# Patient Record
Sex: FEMALE | Race: WHITE | URBAN - NONMETROPOLITAN AREA
[De-identification: names, ages, dates, MRNs, and addresses within clinical notes are randomized per-mention and may not be internally consistent; named-entity substitution may affect disease eponyms.]

---

## 2020-08-20 ENCOUNTER — HISTORICAL (OUTPATIENT)
Dept: ADMINISTRATIVE | Facility: HOSPITAL | Age: 45
End: 2020-08-20

## 2024-07-11 ENCOUNTER — OFFICE VISIT (OUTPATIENT)
Dept: OBSTETRICS AND GYNECOLOGY | Facility: CLINIC | Age: 49
End: 2024-07-11
Payer: COMMERCIAL

## 2024-07-11 VITALS
BODY MASS INDEX: 45.31 KG/M2 | SYSTOLIC BLOOD PRESSURE: 138 MMHG | DIASTOLIC BLOOD PRESSURE: 84 MMHG | WEIGHT: 240 LBS | HEIGHT: 61 IN

## 2024-07-11 DIAGNOSIS — E66.01 MORBID OBESITY WITH BMI OF 45.0-49.9, ADULT: ICD-10-CM

## 2024-07-11 DIAGNOSIS — R45.86 MOOD SWING: ICD-10-CM

## 2024-07-11 DIAGNOSIS — Z01.411 ENCOUNTER FOR GYNECOLOGICAL EXAMINATION (GENERAL) (ROUTINE) WITH ABNORMAL FINDINGS: Primary | ICD-10-CM

## 2024-07-11 DIAGNOSIS — F41.9 ANXIETY: ICD-10-CM

## 2024-07-11 DIAGNOSIS — Z12.39 ENCOUNTER FOR SCREENING FOR MALIGNANT NEOPLASM OF BREAST, UNSPECIFIED SCREENING MODALITY: ICD-10-CM

## 2024-07-11 DIAGNOSIS — R23.2 HOT FLASHES: ICD-10-CM

## 2024-07-11 DIAGNOSIS — N95.2 VAGINAL ATROPHY: ICD-10-CM

## 2024-07-11 PROCEDURE — 3008F BODY MASS INDEX DOCD: CPT | Mod: CPTII,,, | Performed by: NURSE PRACTITIONER

## 2024-07-11 PROCEDURE — 99386 PREV VISIT NEW AGE 40-64: CPT | Mod: ,,, | Performed by: NURSE PRACTITIONER

## 2024-07-11 PROCEDURE — 1160F RVW MEDS BY RX/DR IN RCRD: CPT | Mod: CPTII,,, | Performed by: NURSE PRACTITIONER

## 2024-07-11 PROCEDURE — 3075F SYST BP GE 130 - 139MM HG: CPT | Mod: CPTII,,, | Performed by: NURSE PRACTITIONER

## 2024-07-11 PROCEDURE — 3079F DIAST BP 80-89 MM HG: CPT | Mod: CPTII,,, | Performed by: NURSE PRACTITIONER

## 2024-07-11 PROCEDURE — 1159F MED LIST DOCD IN RCRD: CPT | Mod: CPTII,,, | Performed by: NURSE PRACTITIONER

## 2024-07-11 RX ORDER — MONTELUKAST SODIUM 10 MG/1
10 TABLET ORAL DAILY
COMMUNITY
Start: 2016-05-01

## 2024-07-11 RX ORDER — DEXTROAMPHETAMINE SACCHARATE, AMPHETAMINE ASPARTATE, DEXTROAMPHETAMINE SULFATE, AND AMPHETAMINE SULFATE 7.5; 7.5; 7.5; 7.5 MG/1; MG/1; MG/1; MG/1
TABLET ORAL
COMMUNITY
Start: 2024-06-12

## 2024-07-11 NOTE — PROGRESS NOTES
Chief Complaint: Annual exam    Chief Complaint   Patient presents with    Well Woman       HPI:   Vikas Ogden is a 48 y.o. year old  s/p hysterectomy due to menorrhagia, dysmenorrhea here today to establish GYN care, Annual Exam. C/o hot flashes, occ hot flashes, fatigue, weight gain, mood swings, anxiety, irritability, vaginal dryness. Reports increase in stress due to new job x9 months. Hx of Wellbutrin use in past, believes to have improved anxiety at that time.   Denies abnormal discharge, odor, bleeding.     Maternal Grandmother with hx of Breast CA, dx late in life.     Gyn History:    Menstrual History   Cycle: No  Menarche Age: 1 years  Intermenstrual Bleeding: No  Dysmenorrhea: No  No Cycle Reason: (!) Surgical  Surgical Reason: hysterectomy  [place]  Sexually Active: Yes  Sexual Orientation: heterosexual  Postcoital Bleeding: No  Dyspareunia: No  STI History: No  Contraception: No  Menopause  Menopause Age: 1 years  Post Menopausal Bleeding: No  Hormone Replacement Therapy: No  Breast History  Last Breast Imaging Date: No  History of Breast Biopsy: No  Pap History   History of Abnormal Pap: No  HPV Vaccine Completed: No        Past Medical History:   Diagnosis Date    Anxiety ????    Gradually worse over years    Depression ????    Off and on    Heart murmur ????    Occasionally    Hormone disorder ????    Within last 5 years    Hypertension ????    Always slightly high     Past Surgical History:   Procedure Laterality Date     SECTION      First two children    COSMETIC SURGERY  2012    Tummy tuck    HYSTERECTOMY  2010    TUBAL LIGATION         Current Outpatient Medications:     ADDERALL 30 mg Tab, , Disp: , Rfl:     montelukast (SINGULAIR) 10 mg tablet, Take 10 mg by mouth once daily., Disp: , Rfl:   Review of patient's allergies indicates:   Allergen Reactions    Sudafed cold-allergy      OB History    Para Term  AB Living   3 3 2 1   2   SAB IAB  Ectopic Multiple Live Births           2      # Outcome Date GA Lbr Juan/2nd Weight Sex Type Anes PTL Lv   3  10/26/00    M Vag-Spont   TESSY   2 Term 99     CS-LTranv      1 Term 97     CS-LTranv   TESSY     Social History     Tobacco Use    Smoking status: Former     Current packs/day: 0.25     Average packs/day: 0.3 packs/day for 5.0 years (1.3 ttl pk-yrs)     Types: Cigarettes    Tobacco comments:     Not often or long enough to chart. Sporadically from Teenage years to late 20s.   Substance Use Topics    Alcohol use: Not Currently     Comment: Not often enough to chart    Drug use: Never     Family History   Problem Relation Name Age of Onset    Cancer Maternal Grandfather Sienna Guan    Diabetes Maternal Grandfather Sienna     Breast cancer Maternal Grandmother Mary Ann Guan    Cancer Paternal Grandfather Phil Ogden    Stroke Paternal Grandmother Lexis         Alin       Review of Systems:   Review of Systems   Constitutional:  Negative for appetite change, chills, fatigue, fever and unexpected weight change.   Eyes:  Negative for visual disturbance.   Respiratory:  Negative for cough, shortness of breath and wheezing.    Cardiovascular:  Negative for chest pain, palpitations and leg swelling.   Gastrointestinal:  Negative for abdominal pain, bloating, blood in stool, constipation, diarrhea, nausea, vomiting, reflux and fecal incontinence.   Endocrine: Negative for hair loss and hot flashes.   Genitourinary:  Positive for hot flashes. Negative for bladder incontinence, decreased libido, dysmenorrhea, dyspareunia, dysuria, flank pain, frequency, genital sores, hematuria, menorrhagia, menstrual problem, pelvic pain, urgency, vaginal bleeding, vaginal discharge, vaginal pain, urinary incontinence, postcoital bleeding, postmenopausal bleeding, vaginal dryness and vaginal odor.   Integumentary:  Negative for rash, acne, hair changes, breast mass, nipple  "discharge, breast skin changes and breast tenderness.   Neurological:  Negative for headaches.   Psychiatric/Behavioral:  Negative for depression. The patient is nervous/anxious.    Breast: Negative for asymmetry, breast self exam, lump, mass, mastodynia, nipple discharge, skin changes and tenderness       Physical Exam:  /84   Ht 5' 1" (1.549 m)   Wt 108.9 kg (240 lb)   BMI 45.35 kg/m²       Physical Exam:   Constitutional: She is oriented to person, place, and time. She appears well-developed and well-nourished.    HENT:   Head: Normocephalic.      Cardiovascular:       Exam reveals no edema.        Pulmonary/Chest: Effort normal. She exhibits no mass, no tenderness, no bony tenderness, no deformity and no retraction. Right breast exhibits inverted nipple. Right breast exhibits no mass, no nipple discharge, no skin change, no tenderness, no bleeding, no swelling, no mastectomy, no augmentation and no lumpectomy. Left breast exhibits inverted nipple. Left breast exhibits no mass, no nipple discharge, no skin change, no tenderness, no bleeding, no swelling, no mastectomy, no augmentation and no lumpectomy. Breasts are symmetrical.   Pt reports nipples to have always been inverted.         Abdominal: Soft. She exhibits no distension and no mass. There is no abdominal tenderness. There is no rebound and no guarding. No hernia. Hernia confirmed negative in the right inguinal area.     Genitourinary:    Inguinal canal, right adnexa, left adnexa and rectum normal.   Rectum:      No anal fissure or external hemorrhoid.   The external female genitalia was normal.   No external genitalia lesions identified,Genitalia hair distrobution normal .     Labial bartholins normal.There is no rash, tenderness, lesion or injury on the right labia. There is no rash, tenderness, lesion or injury on the left labia. No no masses or organomegaly. Right adnexum displays no mass, no tenderness and no fullness. Left adnexum displays " no mass, no tenderness and no fullness. Vagina exhibits no lesion. No erythema, vaginal discharge, tenderness, bleeding, rectocele, cystocele or prolapse of vaginal walls in the vagina.    No foreign body in the vagina.      No signs of injury in the vagina.   Vagina was moist.Cervix is absent.Uterus is absent. Normal urethral meatus.Urethral Meatus exhibits: urethral lesionUrethra findings: no urethral mass, no tenderness and prolapsedBladder findings: no bladder tenderness   Genitourinary Comments: Vaginal atrophy              Musculoskeletal: Normal range of motion.      Lymphadenopathy: No inguinal adenopathy noted on the right or left side.    Neurological: She is alert and oriented to person, place, and time.    Skin: Skin is warm and dry.    Psychiatric: She has a normal mood and affect. Her behavior is normal. Judgment and thought content normal.        Assessment:   Annual Well Women Exam  1. Encounter for gynecological examination (general) (routine) with abnormal findings    2. Encounter for screening for malignant neoplasm of breast, unspecified screening modality  - Mammo Digital Screening Bilat w/ [facility]; Future    3. Hot flashes    4. Mood swing    5. Anxiety    6. Vaginal atrophy        Plan:    Breast Self-awareness  Recommend annual mammogram  Recommend exercise at least 3 times weekly  Healthy, balanced diet  Keep yearly follow up with PCP  No follow-ups on file.   Vikas was seen today for well woman.    Diagnoses and all orders for this visit:    Encounter for gynecological examination (general) (routine) with abnormal findings    Encounter for screening for malignant neoplasm of breast, unspecified screening modality  -     Mammo Digital Screening Bilat w/ [facility]equ[facility]; Future    Hot flashes    Mood swing    Anxiety    Vaginal atrophy      Discussed importance of healthy diet, exercise, and achieving/maintaining a BMI <30.   Discussed healthy calorie restricted diet, weight bearing exercise if tolerated.  Discussed [employer] Pal [place], Lose It FORREST for calorie control.     Discussed vaginal atrophy and recommendation of vaginal Premarin cream. Discussed potential risks, desires trial at this time.   Rx Premarin cream     Discussed anxiety and depression in detail.   Discussed treatment options including life style modification- healthy diet and exercise, spending time outdoors. Discussed medications and all associated risks and side effects of SSRI's. Discussed counseling. Strict precautions.     Reviewed self-help strategies (dietary changes/exercise/accupuncture/etc.), herbal and other OTC therapies, hormonal options as well as other medications used to treat common menopausal symptoms. Layered clothing, fans.     Discussed hot flashes, menopausal symptoms and hormone replacement therapy.  Discussed risks of HRT including but not limited to: Deep vein thrombosis, pulmonary emboli, stroke, increased cancer risk, etc.  Also discussed alternatives such as Paroxetine, Clonidine, non hormonal medications such as Veozah.    Discussed potential trial of Contrave - rx sent in    Premarin vaginal cream as directed    RTC 3 months    Counseling:    A brief discussion of STD prevention was had.    Avoidance of cigarette smoking, alcohol use, and drug use was encouraged.    A healthy diet and regular exercise was stressed.    All questions were answered and the patient voiced understanding of the above issues.      This note was transcribed by Yolanda Teixeira. There may be transcription errors as a result, however minimal. Effort has been made to ensure accuracy of transcription, but any obvious errors or omissions should be clarified with the author of the document.

## 2024-07-15 ENCOUNTER — HOSPITAL ENCOUNTER (OUTPATIENT)
Dept: RADIOLOGY | Facility: HOSPITAL | Age: 49
Discharge: HOME | End: 2024-07-15
Attending: NURSE PRACTITIONER
Payer: COMMERCIAL

## 2024-07-15 DIAGNOSIS — Z12.39 ENCOUNTER FOR SCREENING FOR MALIGNANT NEOPLASM OF BREAST, UNSPECIFIED SCREENING MODALITY: ICD-10-CM

## 2024-07-15 PROCEDURE — 77063 BREAST TOMOSYNTHESIS BI: CPT | Mod: TC

## 2024-08-02 ENCOUNTER — TELEPHONE (OUTPATIENT)
Dept: OBSTETRICS AND GYNECOLOGY | Facility: CLINIC | Age: 49
End: 2024-08-02
Payer: COMMERCIAL

## 2024-08-06 ENCOUNTER — LAB VISIT (OUTPATIENT)
Dept: LAB | Facility: HOSPITAL | Age: 49
End: 2024-08-06
Attending: NURSE PRACTITIONER
Payer: COMMERCIAL

## 2024-08-06 ENCOUNTER — OFFICE VISIT (OUTPATIENT)
Dept: OBSTETRICS AND GYNECOLOGY | Facility: CLINIC | Age: 49
End: 2024-08-06
Payer: COMMERCIAL

## 2024-08-06 VITALS
HEIGHT: 61 IN | DIASTOLIC BLOOD PRESSURE: 82 MMHG | BODY MASS INDEX: 45.54 KG/M2 | WEIGHT: 241.19 LBS | SYSTOLIC BLOOD PRESSURE: 130 MMHG

## 2024-08-06 DIAGNOSIS — N95.1 MENOPAUSAL SYMPTOMS: ICD-10-CM

## 2024-08-06 DIAGNOSIS — E66.01 MORBID OBESITY WITH BMI OF 45.0-49.9, ADULT: Primary | ICD-10-CM

## 2024-08-06 DIAGNOSIS — L68.0 HIRSUTISM: ICD-10-CM

## 2024-08-06 PROCEDURE — 1160F RVW MEDS BY RX/DR IN RCRD: CPT | Mod: CPTII,,, | Performed by: NURSE PRACTITIONER

## 2024-08-06 PROCEDURE — 36415 COLL VENOUS BLD VENIPUNCTURE: CPT

## 2024-08-06 PROCEDURE — 1159F MED LIST DOCD IN RCRD: CPT | Mod: CPTII,,, | Performed by: NURSE PRACTITIONER

## 2024-08-06 PROCEDURE — 82670 ASSAY OF TOTAL ESTRADIOL: CPT

## 2024-08-06 PROCEDURE — 83001 ASSAY OF GONADOTROPIN (FSH): CPT

## 2024-08-06 PROCEDURE — 99212 OFFICE O/P EST SF 10 MIN: CPT | Mod: ,,, | Performed by: NURSE PRACTITIONER

## 2024-08-06 PROCEDURE — 3075F SYST BP GE 130 - 139MM HG: CPT | Mod: CPTII,,, | Performed by: NURSE PRACTITIONER

## 2024-08-06 PROCEDURE — 84403 ASSAY OF TOTAL TESTOSTERONE: CPT

## 2024-08-06 PROCEDURE — 84402 ASSAY OF FREE TESTOSTERONE: CPT

## 2024-08-06 PROCEDURE — 3079F DIAST BP 80-89 MM HG: CPT | Mod: CPTII,,, | Performed by: NURSE PRACTITIONER

## 2024-08-06 PROCEDURE — 82627 DEHYDROEPIANDROSTERONE: CPT

## 2024-08-06 PROCEDURE — 3008F BODY MASS INDEX DOCD: CPT | Mod: CPTII,,, | Performed by: NURSE PRACTITIONER

## 2024-08-06 RX ORDER — ALBUTEROL SULFATE 90 UG/1
1 INHALANT RESPIRATORY (INHALATION) EVERY 4 HOURS PRN
COMMUNITY
Start: 2024-07-18

## 2024-08-06 RX ORDER — FLUTICASONE PROPIONATE AND SALMETEROL 250; 50 UG/1; UG/1
1 POWDER RESPIRATORY (INHALATION) 2 TIMES DAILY
COMMUNITY
Start: 2024-07-18

## 2024-08-07 LAB
ESTRADIOL SERPL HS-MCNC: 43 PG/ML
FSH SERPL-ACNC: 92.44 MIU/ML

## 2024-08-08 LAB — DHEA-S SERPL-MCNC: 14 MCG/DL (ref 27–240)

## 2024-08-17 LAB
TESTOST FREE SERPL-MCNC: 0.41 NG/DL
TESTOST SERPL-MCNC: 15 NG/DL (ref 8–60)

## 2024-08-20 ENCOUNTER — TELEPHONE (OUTPATIENT)
Dept: OBSTETRICS AND GYNECOLOGY | Facility: CLINIC | Age: 49
End: 2024-08-20
Payer: COMMERCIAL

## 2024-08-20 NOTE — TELEPHONE ENCOUNTER
Discussed results with patient - FSH elevated, DHEA low, testosterone and estradiol normal   To begin DHEA daily

## 2024-08-20 NOTE — TELEPHONE ENCOUNTER
----- Message from Genevieve Arana sent at 8/20/2024  1:06 PM CDT -----  Regarding: Pt Advice  Contact: Patient  Patient has questions on medication discussed previously   [phone]

## 2024-10-02 ENCOUNTER — OFFICE VISIT (OUTPATIENT)
Dept: OBSTETRICS AND GYNECOLOGY | Facility: CLINIC | Age: 49
End: 2024-10-02
Payer: COMMERCIAL

## 2024-10-02 VITALS
TEMPERATURE: 96 F | WEIGHT: 235.63 LBS | SYSTOLIC BLOOD PRESSURE: 124 MMHG | HEIGHT: 61 IN | BODY MASS INDEX: 44.49 KG/M2 | DIASTOLIC BLOOD PRESSURE: 80 MMHG

## 2024-10-02 DIAGNOSIS — N95.2 ATROPHIC VAGINITIS: Primary | ICD-10-CM

## 2024-10-02 PROCEDURE — 3079F DIAST BP 80-89 MM HG: CPT | Mod: CPTII,,, | Performed by: NURSE PRACTITIONER

## 2024-10-02 PROCEDURE — 3008F BODY MASS INDEX DOCD: CPT | Mod: CPTII,,, | Performed by: NURSE PRACTITIONER

## 2024-10-02 PROCEDURE — 1160F RVW MEDS BY RX/DR IN RCRD: CPT | Mod: CPTII,,, | Performed by: NURSE PRACTITIONER

## 2024-10-02 PROCEDURE — 99212 OFFICE O/P EST SF 10 MIN: CPT | Mod: ,,, | Performed by: NURSE PRACTITIONER

## 2024-10-02 PROCEDURE — 3074F SYST BP LT 130 MM HG: CPT | Mod: CPTII,,, | Performed by: NURSE PRACTITIONER

## 2024-10-02 PROCEDURE — 1159F MED LIST DOCD IN RCRD: CPT | Mod: CPTII,,, | Performed by: NURSE PRACTITIONER

## 2024-10-02 NOTE — PROGRESS NOTES
Chief Complaint     Follow-up (F/u on Premarin vag cream, pt states that it does work and is good)    HPI:     Patient is a 48 y.o.  presents today to follow up Premarin cream for vaginal atrophy. Currently taking DHEA daily due to low DHEA. Reports feeling well since last visit.    Reports fired from job yesterday, sad but not suicidal or homicidal.  Has good family support.    24:  FSH: 92.44  ESTRADIOL: 43  DHEA-S: 14  TESTOSTERONE: 0.41    Gyn History:    Menstrual History  Cycle: No  Menarche Age: 1 years  No Cycle Reason: (!) Surgical  Surgical Reason: hysterectomy    Menopause  Menopause Age: 1 years  Post Menopausal Bleeding: No  Hormone Replacement Therapy: No    Pap History  HPV Vaccine Completed: No    [place]  Sexually Active: Yes  Sexual Orientation: heterosexual  Postcoital Bleeding: No  Dyspareunia: No  STI History: No  Contraception: No    Breast History  Last Breast Imaging Date: Yes  Date: 24  History of Abnormal Breast Imaging : No  History of Breast Biopsy: No          Past Medical History:   Diagnosis Date    Anxiety ????    Gradually worse over years    Depression ????    Off and on    Heart murmur ????    Occasionally    Hormone disorder ????    Within last 5 years    Hypertension ????    Always slightly high       Past Surgical History:   Procedure Laterality Date     SECTION      First two children    COSMETIC SURGERY      Tummy tuck    HYSTERECTOMY  2010    TUBAL LIGATION         Family History   Problem Relation Name Age of Onset    Cancer Maternal Grandfather Sienna Guan    Diabetes Maternal Grandfather Sienna     Breast cancer Maternal Grandmother Mary Ann Guan    Cancer Paternal Grandfather Phil Ogden    Stroke Paternal Grandmother Lexis Ogden       OB History          3    Para   3    Term   2       1    AB        Living   2         SAB        IAB        Ectopic        Multiple  "       Live Births   2                 Current Outpatient Medications on File Prior to Visit   Medication Sig Dispense Refill    ADDERALL 30 mg Tab       albuterol (PROVENTIL/VENTOLIN HFA) 90 mcg/actuation inhaler Inhale 1 puff into the lungs every 4 (four) hours as needed.      conjugated estrogens (PREMARIN) vaginal cream 0.5 g per vagina QHS x14 days then 2 times weekly thereafter. 30 g 6    fluticasone-salmeterol diskus inhaler 250-50 mcg Inhale 1 puff into the lungs 2 (two) times daily.      montelukast (SINGULAIR) 10 mg tablet Take 10 mg by mouth once daily.      [DISCONTINUED] naltrexone-bupropion (CONTRAVE) 8-90 mg TbSR 1 po q AM x 7 days then 1 po BID x 7 days then 2 po q AM and 1 po Q PM x 7 days  then 2 po BID (Patient not taking: Reported on 10/2/2024) 120 tablet 11     No current facility-administered medications on file prior to visit.       Review of Systems:       Review of Systems   Constitutional:  Negative for chills and fever.   Gastrointestinal:  Negative for abdominal pain, constipation and diarrhea.   Genitourinary:  Negative for bladder incontinence, decreased libido, dysmenorrhea, dyspareunia, dysuria, flank pain, frequency, genital sores, hematuria, hot flashes, menorrhagia, menstrual problem, pelvic pain, urgency, vaginal bleeding, vaginal discharge, vaginal pain, urinary incontinence, postcoital bleeding, postmenopausal bleeding, vaginal dryness and vaginal odor.        Physical Exam:    /80 (BP Location: Right arm, Patient Position: Sitting)   Temp 96.1 °F (35.6 °C) (Temporal)   Ht 5' 1" (1.549 m)   Wt 106.9 kg (235 lb 9.6 oz)   BMI 44.52 kg/m²     Physical Exam   General Exam:    General Appearance: alert, in no acute distress, normal, well nourished.  Psych:  Orientation: time, place, person.  Mood/Affect: Normal     Assessment:   1. Atrophic vaginitis             Plan:   1. Atrophic vaginitis    Continue premarin vaginal cream 2-3x/wee  Continue DHEA  RTC for annual or " prn      RTC PRN/ANNUAL     This note was transcribed by Yolanda Teixeira. There may be transcription errors as a result, however minimal. Effort has been made to ensure accuracy of transcription, but any obvious errors or omissions should be clarified with the author of the document.       I agree with the above documentation.            admit to IPP unit  psychiatric plan of care by Dr Leonela ROA .

## 2024-12-19 ENCOUNTER — PATIENT MESSAGE (OUTPATIENT)
Dept: OBSTETRICS AND GYNECOLOGY | Facility: CLINIC | Age: 49
End: 2024-12-19
Payer: COMMERCIAL

## 2025-07-14 NOTE — PROGRESS NOTES
Chief Complaint:  Well Woman (+hot flashes)     History of Present Illness:  Vikas Ogden is a 49 y.o. year old  presents for her well woman exam status post hysterectomy. No vaginal bleeding. +hot flashes and fatigue, requesting hormone levels to be checked.    MM24 benign    Cancer-related family history includes Breast cancer in her maternal grandmother; Cancer in her maternal grandfather and paternal grandfather; Prostate cancer (age of onset: 50 - 59) in her father. There is no history of Uterine cancer or Ovarian cancer.        Gyn History:  Menstrual History  Cycle: No  Menarche Age: 1 years  No Cycle Reason: (!) Surgical  Surgical Reason: hysterectomy    Menopause  Menopause Age: 1 years  Post Menopausal Bleeding: No  Hormone Replacement Therapy: No    Pap History  History of Abnormal Pap: No  HPV Vaccine Completed: No    [place]  Sexually Active: Yes  Sexual Orientation: heterosexual  Postcoital Bleeding: No  Dyspareunia: No  STI History: No    Breast History  Last Breast Imaging Date: Yes  Date: 24  History of Abnormal Breast Imaging : No  History of Breast Biopsy: No        Review of Systems:  General/Constitutional: Chills denies. Fatigue/weakness admits. Fever denies. Night sweats denies. Hot flashes admits    Respiratory: Cough denies. Hemoptysis denies. SOB denies. Sputum production denies. Wheezing denies .   Cardiovascular: Chest pain denies. Dizziness denies. Palpitations denies. Swelling in hands/feet denies.                Breast: Dimpling denies. Breast mass denies. Breast pain/tenderness denies. Nipple discharge denies. Puckering denies.  Gastrointestinal: Abdominal pain denies. Blood in stool denies. Constipation denies. Diarrhea denies. Heartburn denies. Nausea denies. Vomiting denies    Genitourinary: Incontinence denies. Blood in urine denies. Frequent urination denies. Painful urination denies. Urinary urgency denies. Nocturia denies    Gynecologic:  Irregular bleeding denies. Heavy bleeding denies. Painful menses denies. Vaginal discharge denies. Vaginal odor denies. Vaginal itching denies. Vaginal lesion denies. Pelvic pain denies. Decreased libido denies. Vulvar lesion denies. Prolapse of genital organs denies. Painful intercourse denies. Postcoital bleeding denies    Psychiatric: Depression denies. Anxiety denies.     OB History    Para Term  AB Living   3 3 2 1 0 2   SAB IAB Ectopic Multiple Live Births   0 0 0 0 2      # 1 - Date: 97, Sex: None, Weight: None, GA: None, Type: , Low Transverse, Apgar1: None, Apgar5: None, Living: Living, Birth Comments: None    # 2 - Date: 99, Sex: None, Weight: None, GA: None, Type: , Low Transverse, Apgar1: None, Apgar5: None, Living: None, Birth Comments: None    # 3 - Date: 10/26/00, Sex: Male, Weight: None, GA: None, Type: Vaginal, Spontaneous, Apgar1: None, Apgar5: None, Living: Living, Birth Comments: None      Past Medical History:   Diagnosis Date    Anxiety ????    Gradually worse over years    Depression ????    Off and on    Heart murmur ????    Occasionally    Hormone disorder ????    Within last 5 years    Hypertension ????    Always slightly high     Current Medications[1]    Review of patient's allergies indicates:   Allergen Reactions    Sudafed cold-allergy        Past Surgical History:   Procedure Laterality Date    CARPAL TUNNEL RELEASE       SECTION      First two children    COSMETIC SURGERY      Tummy tuck    HYSTERECTOMY      NECK SURGERY  2020    x2 disc replaced    TUBAL LIGATION       Family History   Problem Relation Name Age of Onset    Prostate cancer Father  50 - 59    Breast cancer Maternal Grandmother Mary Ann     Cancer Maternal Grandfather Sienna         unknown    Diabetes Maternal Grandfather Sienna     Stroke Paternal Grandmother Lexis     Cancer Paternal Grandfather Phil     Uterine cancer Neg Hx      Colon  "cancer Neg Hx      Ovarian cancer Neg Hx       Social History[2]    Physical Exam:  /84   Temp 98.2 °F (36.8 °C)   Ht 5' 1" (1.549 m)   Wt 102.1 kg (225 lb)   BMI 42.51 kg/m²     Chaperone: present.       General appearance: healthy, well-nourished and well-developed     Psychiatric: Orientation to time, place and person. Normal mood and affect and active, alert     Skin: Appearance: no rashes or lesions.     Neck:   Neck: supple, FROM, trachea midline. and no masses   Thyroid: no enlargement or nodules and non-tender.       Cardiovascular:   Auscultation: RRR and no murmur.   Peripheral Vascular: no varicosities, LLE edema, RLE edema, calf tenderness, and palpable cord and pedal pulses intact.     Lungs:   Respiratory effort: no intercostal retractions or accessory muscle usage.   Auscultation: no wheezing, rales/crackles, or rhonchi and clear to auscultation.     Breast:   Inspection/Palpation: no tenderness, discrete/distinct masses, skin changes, or abnormal secretions. Nipple appearance normal.     Abdomen:   Auscultation/Inspection/Palpation: no hepatomegaly, splenomegaly, masses, tenderness or CVA tenderness and soft, non-distended bowel sounds preset.    Hernia: no palpable hernias.     Female Genitalia:    Vulva: no masses, tenderness or lesions    Bladder/Urethra: no urethral discharge or mass, normal meatus, bladder non-distended.    Vagina: no tenderness, erythema, cystocele, rectocele, abnormal vaginal discharge or vesicle(s) or ulcers    Cuff intact, no masses, NT   Adnexa/Parametria: no parametrial tenderness or mass, no adnexal tenderness or ovarian mass.     Lymph Nodes:   Palpation: non tender submandibular nodes, axillary nodes, or inguinal nodes.     Rectal Exam:   Rectum: normal perianal skin.       Assessment/Plan:  1. Routine gynecological examination  No PAP   Speculum exam performed.   Reviewed calcium needs, exercise, and prevention of osteoporosis.  Healthy diet  Annual " MMG  Discussed breast self-awareness  Colonoscopy q 10 yrs  Reviewed normal menopause and menopausal symptoms  RTC 1 yr    2. Hot flashes, fatigue  Educated  Fans, layered clothing  Labs: FSH, estradiol  RTC 1 week to review and discuss HRT    3. Atrophic vaginitis  Currently on Premarin vaginal cream  Desires to continue    4. BMI 40.0-44.9, adult  Weight loss with healthy diet and exercise modifications    5. Family history of prostate cancer  6. Family history of breast cancer  Discussed recommendations of annual screening after age of 40 with mammogram and MRI for patients with lifetime risk greater than 25%       Explained that screening is not 100% reliable. Advised patient if she notices any changes to her breast including a lump, mass, dimpling, discharge, rash, or tenderness she should contact us immediately.       Recommend BSE monthly              This note was transcribed by Dayanara Membreno MA. There may be transcription errors as a result, however minimal. I agree with transcription and every effort has been made to ensure accuracy of transcription, but any obvious errors or omissions should be clarified with the author of the document.    I agree with the above documentation            [1]   Current Outpatient Medications:     ADDERALL 30 mg Tab, , Disp: , Rfl:     albuterol (PROVENTIL/VENTOLIN HFA) 90 mcg/actuation inhaler, Inhale 1 puff into the lungs every 4 (four) hours as needed., Disp: , Rfl:     conjugated estrogens (PREMARIN) vaginal cream, 0.5 g per vagina QHS x14 days then 2 times weekly thereafter., Disp: 30 g, Rfl: 6    fluticasone-salmeterol diskus inhaler 250-50 mcg, Inhale 1 puff into the lungs 2 (two) times daily., Disp: , Rfl:     montelukast (SINGULAIR) 10 mg tablet, Take 10 mg by mouth once daily., Disp: , Rfl:   [2]   Social History  Socioeconomic History    Marital status: Significant Other   Tobacco Use    Smoking status: Former     Current packs/day: 0.25     Average packs/day: 0.3  packs/day for 5.0 years (1.3 ttl pk-yrs)     Types: Cigarettes    Smokeless tobacco: Never    Tobacco comments:     Not often or long enough to chart. Sporadically from Teenage years to late 20s.   Substance and Sexual Activity    Alcohol use: Not Currently     Comment: Not often enough to chart    Drug use: Never    Sexual activity: Yes     Partners: Male     Comment: Hysterectomy

## 2025-07-17 ENCOUNTER — HOSPITAL ENCOUNTER (OUTPATIENT)
Dept: RADIOLOGY | Facility: HOSPITAL | Age: 50
Discharge: HOME | End: 2025-07-17
Attending: FAMILY MEDICINE
Payer: MEDICAID

## 2025-07-17 ENCOUNTER — OFFICE VISIT (OUTPATIENT)
Dept: OBSTETRICS AND GYNECOLOGY | Facility: CLINIC | Age: 50
End: 2025-07-17
Payer: MEDICAID

## 2025-07-17 VITALS
SYSTOLIC BLOOD PRESSURE: 138 MMHG | HEIGHT: 61 IN | WEIGHT: 225 LBS | BODY MASS INDEX: 42.48 KG/M2 | DIASTOLIC BLOOD PRESSURE: 84 MMHG | TEMPERATURE: 98 F

## 2025-07-17 DIAGNOSIS — M54.50 LOWER BACK PAIN: ICD-10-CM

## 2025-07-17 DIAGNOSIS — R23.2 HOT FLASHES: ICD-10-CM

## 2025-07-17 DIAGNOSIS — N95.2 ATROPHIC VAGINITIS: ICD-10-CM

## 2025-07-17 DIAGNOSIS — Z01.419 ROUTINE GYNECOLOGICAL EXAMINATION: Primary | ICD-10-CM

## 2025-07-17 DIAGNOSIS — Z80.3 FAMILY HISTORY OF BREAST CANCER: ICD-10-CM

## 2025-07-17 DIAGNOSIS — Z80.42 FAMILY HISTORY OF PROSTATE CANCER: ICD-10-CM

## 2025-07-17 DIAGNOSIS — Z12.31 BREAST CANCER SCREENING BY MAMMOGRAM: ICD-10-CM

## 2025-07-17 DIAGNOSIS — N95.2 VAGINAL ATROPHY: ICD-10-CM

## 2025-07-17 PROCEDURE — 3079F DIAST BP 80-89 MM HG: CPT | Mod: CPTII,,, | Performed by: NURSE PRACTITIONER

## 2025-07-17 PROCEDURE — 3008F BODY MASS INDEX DOCD: CPT | Mod: CPTII,,, | Performed by: NURSE PRACTITIONER

## 2025-07-17 PROCEDURE — 1159F MED LIST DOCD IN RCRD: CPT | Mod: CPTII,,, | Performed by: NURSE PRACTITIONER

## 2025-07-17 PROCEDURE — 72110 X-RAY EXAM L-2 SPINE 4/>VWS: CPT | Mod: TC

## 2025-07-17 PROCEDURE — 99396 PREV VISIT EST AGE 40-64: CPT | Mod: ,,, | Performed by: NURSE PRACTITIONER

## 2025-07-17 PROCEDURE — 3075F SYST BP GE 130 - 139MM HG: CPT | Mod: CPTII,,, | Performed by: NURSE PRACTITIONER

## 2025-07-17 PROCEDURE — 1160F RVW MEDS BY RX/DR IN RCRD: CPT | Mod: CPTII,,, | Performed by: NURSE PRACTITIONER

## 2025-07-21 ENCOUNTER — HOSPITAL ENCOUNTER (OUTPATIENT)
Dept: RADIOLOGY | Facility: HOSPITAL | Age: 50
Discharge: HOME | End: 2025-07-21
Attending: NURSE PRACTITIONER
Payer: MEDICAID

## 2025-07-21 DIAGNOSIS — Z12.31 BREAST CANCER SCREENING BY MAMMOGRAM: ICD-10-CM

## 2025-07-21 PROCEDURE — 77067 SCR MAMMO BI INCL CAD: CPT | Mod: TC

## 2025-07-22 NOTE — PROGRESS NOTES
Chief Complaint:  Follow-up    History of Present Illness:  Patient is a 49 y.o.  presents for lab results.  Recent history of hot flashes and fatigue, fatigue, anxiety, hormone levels drawn.     Estradiol <24  FSH 92.44        Gyn History:  Menstrual History  Cycle: No  Menarche Age: 1 years  No Cycle Reason: (!) Surgical  Surgical Reason: hysterectomy    Menopause  Menopause Age: 1 years  Post Menopausal Bleeding: No  Hormone Replacement Therapy: Yes    Pap History  Result: Normal  History of Abnormal Pap: No    [place]  Sexually Active: No  STI History: No  Contraception: No    Breast History  Last Breast Imaging Date: Yes  Date: 25  History of Abnormal Breast Imaging : No  History of Breast Biopsy: No      Review of systems:  General/Constitutional: Chills denies. Fatigue/weakness admits. Fever denies. Night sweats denies. Hot flashes admits  Breast: Dimpling denies. Breast mass denies. Breast pain/tenderness denies. Nipple discharge denies. Puckering denies.  Gastrointestinal: Abdominal pain denies. Blood in stool denies. Constipation denies. Diarrhea denies. Heartburn denies. Nausea denies. Vomiting denies   Genitourinary: Incontinence denies. Blood in urine denies. Frequent urination denies. Urgency denies. Painful urination denies. Nocturia denies   Gynecologic: Irregular bleeding denies. Heavy bleeding denies. Painful menses denies. Vaginal discharge denies. Vaginal odor denies. Vaginal itching/Irritation denies. Vaginal lesion denies.  Pelvic pain denies. Decreased libido denies. Vulvar lesion denies. Prolapse of genital organs denies. Painful intercourse denies. Postcoital bleeding denies   Psychiatric: Mood lability denies. Depressed mood denies. Suicidal thoughts denies. Anxiety admits. Overwhelmed denies. Appetite normal. Energy level normal.     OB History    Para Term  AB Living   3 3 2 1 0 2   SAB IAB Ectopic Multiple Live Births   0 0 0 0 2      # 1 - Date: 97,  "Sex: None, Weight: None, GA: None, Type: , Low Transverse, Apgar1: None, Apgar5: None, Living: Living, Birth Comments: None    # 2 - Date: 99, Sex: None, Weight: None, GA: None, Type: , Low Transverse, Apgar1: None, Apgar5: None, Living: None, Birth Comments: None    # 3 - Date: 10/26/00, Sex: Male, Weight: None, GA: None, Type: Vaginal, Spontaneous, Apgar1: None, Apgar5: None, Living: Living, Birth Comments: None      Past Medical History:   Diagnosis Date    Anxiety ????    Gradually worse over years    Depression ????    Off and on    Heart murmur ????    Occasionally    Hormone disorder ????    Within last 5 years    Hypertension ????    Always slightly high     Current Medications[1]      Physical Exam:  BP (!) 140/88 (BP Location: Right arm, Patient Position: Sitting)   Ht 5' 1" (1.549 m)   Wt 103.9 kg (229 lb)   BMI 43.27 kg/m²     Constitutional: General appearance: healthy, well-nourished and well-developed   Psychiatric:  Orientation to time, place and person. Normal mood and affect and active, alert       Assessment/Plan:  1. Hot flashes    2. Menopausal symptoms    3. Anxiety       Educated  Reviewed labs with patient    FSH: 97.37  Estradiol: <24    Begin Wellbutrin  mg daily  F/u 2 months    Future Appointments   Date Time Provider Department Center   2026  8:00 AM Darling Guillaume NP [facility]              [1]   Current Outpatient Medications:     ADDERALL 30 mg Tab, , Disp: , Rfl:     albuterol (PROVENTIL/VENTOLIN HFA) 90 mcg/actuation inhaler, Inhale 1 puff into the lungs every 4 (four) hours as needed., Disp: , Rfl:     conjugated estrogens (PREMARIN) vaginal cream, 0.5 g per vagina QHS x14 days then 2 times weekly thereafter., Disp: 30 g, Rfl: 6    fluticasone-salmeterol diskus inhaler 250-50 mcg, Inhale 1 puff into the lungs 2 (two) times daily., Disp: , Rfl:     montelukast (SINGULAIR) 10 mg tablet, Take 10 mg by mouth once daily., Disp: " , Rfl:

## 2025-07-24 ENCOUNTER — OFFICE VISIT (OUTPATIENT)
Dept: OBSTETRICS AND GYNECOLOGY | Facility: CLINIC | Age: 50
End: 2025-07-24
Payer: MEDICAID

## 2025-07-24 VITALS
SYSTOLIC BLOOD PRESSURE: 140 MMHG | WEIGHT: 229 LBS | HEIGHT: 61 IN | DIASTOLIC BLOOD PRESSURE: 88 MMHG | BODY MASS INDEX: 43.23 KG/M2

## 2025-07-24 DIAGNOSIS — R23.2 HOT FLASHES: Primary | ICD-10-CM

## 2025-07-24 DIAGNOSIS — F41.9 ANXIETY: ICD-10-CM

## 2025-07-24 DIAGNOSIS — N95.1 MENOPAUSAL SYMPTOMS: ICD-10-CM

## 2025-07-24 PROCEDURE — 3008F BODY MASS INDEX DOCD: CPT | Mod: CPTII,,, | Performed by: NURSE PRACTITIONER

## 2025-07-24 PROCEDURE — 1160F RVW MEDS BY RX/DR IN RCRD: CPT | Mod: CPTII,,, | Performed by: NURSE PRACTITIONER

## 2025-07-24 PROCEDURE — 99213 OFFICE O/P EST LOW 20 MIN: CPT | Mod: ,,, | Performed by: NURSE PRACTITIONER

## 2025-07-24 PROCEDURE — 3077F SYST BP >= 140 MM HG: CPT | Mod: CPTII,,, | Performed by: NURSE PRACTITIONER

## 2025-07-24 PROCEDURE — 3079F DIAST BP 80-89 MM HG: CPT | Mod: CPTII,,, | Performed by: NURSE PRACTITIONER

## 2025-07-24 PROCEDURE — 1159F MED LIST DOCD IN RCRD: CPT | Mod: CPTII,,, | Performed by: NURSE PRACTITIONER

## 2025-07-24 RX ORDER — BUPROPION HYDROCHLORIDE 150 MG/1
150 TABLET ORAL DAILY
Qty: 30 TABLET | Refills: 11 | Status: SHIPPED | OUTPATIENT
Start: 2025-07-24 | End: 2026-07-24